# Patient Record
Sex: MALE | Race: WHITE | NOT HISPANIC OR LATINO
[De-identification: names, ages, dates, MRNs, and addresses within clinical notes are randomized per-mention and may not be internally consistent; named-entity substitution may affect disease eponyms.]

---

## 2017-04-11 ENCOUNTER — TRANSCRIPTION ENCOUNTER (OUTPATIENT)
Age: 52
End: 2017-04-11

## 2017-04-11 PROBLEM — Z00.00 ENCOUNTER FOR PREVENTIVE HEALTH EXAMINATION: Status: ACTIVE | Noted: 2017-04-11

## 2017-04-17 ENCOUNTER — APPOINTMENT (OUTPATIENT)
Dept: GASTROENTEROLOGY | Facility: CLINIC | Age: 52
End: 2017-04-17

## 2017-04-18 ENCOUNTER — APPOINTMENT (OUTPATIENT)
Dept: GASTROENTEROLOGY | Facility: CLINIC | Age: 52
End: 2017-04-18

## 2017-05-25 ENCOUNTER — EMERGENCY (EMERGENCY)
Facility: HOSPITAL | Age: 52
LOS: 1 days | Discharge: HOME | End: 2017-05-25
Attending: EMERGENCY MEDICINE | Admitting: EMERGENCY MEDICINE
Payer: SELF-PAY

## 2017-05-25 VITALS
TEMPERATURE: 98 F | WEIGHT: 184.97 LBS | RESPIRATION RATE: 16 BRPM | SYSTOLIC BLOOD PRESSURE: 130 MMHG | HEIGHT: 69 IN | OXYGEN SATURATION: 99 % | HEART RATE: 68 BPM | DIASTOLIC BLOOD PRESSURE: 86 MMHG

## 2017-05-25 DIAGNOSIS — Z98.890 OTHER SPECIFIED POSTPROCEDURAL STATES: Chronic | ICD-10-CM
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
## 2017-05-25 DIAGNOSIS — S62.367A NONDISPLACED FRACTURE OF NECK OF FIFTH METACARPAL BONE, LEFT HAND, INITIAL ENCOUNTER FOR CLOSED FRACTURE: ICD-10-CM

## 2017-05-25 DIAGNOSIS — M79.641 PAIN IN RIGHT HAND: ICD-10-CM

## 2017-05-25 PROCEDURE — 99283 EMERGENCY DEPT VISIT LOW MDM: CPT

## 2017-05-25 PROCEDURE — 73130 X-RAY EXAM OF HAND: CPT | Mod: 26,RT

## 2017-05-25 PROCEDURE — 26600 TREAT METACARPAL FRACTURE: CPT | Mod: F9

## 2017-05-25 PROCEDURE — 73130 X-RAY EXAM OF HAND: CPT

## 2017-05-25 PROCEDURE — 99285 EMERGENCY DEPT VISIT HI MDM: CPT | Mod: 25

## 2017-05-25 PROCEDURE — 73120 X-RAY EXAM OF HAND: CPT

## 2017-05-25 PROCEDURE — 73130 X-RAY EXAM OF HAND: CPT | Mod: 26,77,RT

## 2017-05-25 NOTE — ED PROVIDER NOTE - MUSCULOSKELETAL, MLM
Marked tenderness to palpation with swelling and ecchymosis to dorsum of right hand mostly over 4th and 5th MCPJ with intact N/V RUE

## 2017-05-25 NOTE — CONSULT NOTE ADULT - ASSESSMENT
A/P:  52y/o M with Right small finger metacarpal fracture s/p closed reduction with splint.  - Maintain splint, RUE elevation  - Pain control  - F/U post-reductio films  - F/U 1 week

## 2017-05-25 NOTE — ED PROVIDER NOTE - OBJECTIVE STATEMENT
50 yo white male with fist vs counter top few days ago and now presents with pain and swelling to right hand.

## 2017-05-25 NOTE — ED ADULT NURSE NOTE - OBJECTIVE STATEMENT
received pt with  self injury rt hand on sunday  bruises and slight swelling noted pt evaluated and awaiting xray

## 2017-05-25 NOTE — ED PROVIDER NOTE - CONSTITUTIONAL, MLM
Well appearing, white male, well nourished, awake, alert, oriented to person, place, time/situation and in no apparent distress. normal...

## 2017-05-25 NOTE — CONSULT NOTE ADULT - SUBJECTIVE AND OBJECTIVE BOX
52 y/o M, RHD, presents with right small finger pain after punching the granite countertop 3 days ago.  Patient reports pain and bruising.  Patient denies weakness/numbness in the hand.    PMhx/PShx: Denies  All: NKDA  SHx: NO toxic habits    AVSS  Right Hand:  + Ecchymosis/+Swelling in small finger with tenderness.  Intact neurovascular exam.      Xray Right Hand:    Findings: There is an age indeterminate fracture through the right fifth   metacarpal neck with resultant apex dorsal angulation. No additional   fractures noted. The imaged joint spaces appear unremarkable.    IMPRESSION: Fracture through the right fifth metacarpal neck, as detailed.    Procedure:  Right small finger prepped and draped.  Radial nerve block, digital block delivered.  Closed reduction of fracture.  Ulnar gutter splint applied.

## 2017-06-14 ENCOUNTER — EMERGENCY (EMERGENCY)
Facility: HOSPITAL | Age: 52
LOS: 1 days | Discharge: HOME | End: 2017-06-14
Attending: EMERGENCY MEDICINE | Admitting: EMERGENCY MEDICINE
Payer: SELF-PAY

## 2017-06-14 VITALS
RESPIRATION RATE: 12 BRPM | SYSTOLIC BLOOD PRESSURE: 114 MMHG | WEIGHT: 190.04 LBS | HEIGHT: 69 IN | HEART RATE: 70 BPM | TEMPERATURE: 98 F | DIASTOLIC BLOOD PRESSURE: 75 MMHG | OXYGEN SATURATION: 96 %

## 2017-06-14 DIAGNOSIS — S62.91XD UNSPECIFIED FRACTURE OF RIGHT WRIST AND HAND, SUBSEQUENT ENCOUNTER FOR FRACTURE WITH ROUTINE HEALING: ICD-10-CM
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
## 2017-06-14 DIAGNOSIS — Z98.890 OTHER SPECIFIED POSTPROCEDURAL STATES: Chronic | ICD-10-CM

## 2017-06-14 PROCEDURE — 99282 EMERGENCY DEPT VISIT SF MDM: CPT

## 2017-06-14 NOTE — ED ADULT NURSE NOTE - ED STAT RN HANDOFF DETAILS
pt wanted to speak to MD lowe before D/C. MD lowe at bs answering all questions, pt left without d/c paperwork and without vs.

## 2017-06-14 NOTE — ED PROVIDER NOTE - OBJECTIVE STATEMENT
50 y/o male presents to ED c/o right hand pain x 3 weeks. States he has a 5th metacarpal fracture and was splinted in the ER 3 weeks ago. States he is scheduled for surgery in july and has been following up with Dr Mendez. States he has been having some tingling in his fingers over the last 1 week. States he is annoyed that he is not getting surgery sooner. Denies any other complaints. States he otherwise feels good. Denies n/v, f/c, chest pain, sob, numbness. Denies recent fall or trauma.

## 2017-06-14 NOTE — ED PROVIDER NOTE - MEDICAL DECISION MAKING DETAILS
spoke with dr choi who advised pt f/u as out pt for out pt surgery. discussed with pt who understands.

## 2017-06-14 NOTE — ED PROVIDER NOTE - PROGRESS NOTE DETAILS
spoke with dr allen regarding pt history and physical exam, dr allen advised dc and f/u as outpt. pt NVI.

## 2017-06-14 NOTE — ED ADULT TRIAGE NOTE - CHIEF COMPLAINT QUOTE
patient with right hand in splint placed approx. 3 weeks ago, reports that as per Ortho hand needs to be placed in pins. pt has an appt with ortho in July but reports needing the procedure sooner because of pain, numbness and tingling in fingers.

## 2017-06-14 NOTE — ED PROVIDER NOTE - PHYSICAL EXAMINATION
right upper extremity- ulnar gutter splint intact. FROM 1st-3rd fingers. No redness, no warmth, no swelling, no bruising, no deformity, no tenderness. NVI, good distal pulses BL, capillary refill <2 sec BL, sensation intact throughout, 5/5 motor x 4 extremities.

## 2017-06-14 NOTE — ED PROVIDER NOTE - ATTENDING CONTRIBUTION TO CARE
I have personally performed a face to face diagnostic evaluation on this patient.  I have reviewed the PA note and agree with the history, exam, and plan of care, except as noted.  History and Exam by me shows  right hand fracture x 3 weeks. pt unable to work out payment c with Dr. Lyles and requested another referral.  right hand and wrist splinted. I have personally performed a face to face diagnostic evaluation on this patient.  I have reviewed the PA note and agree with the history, exam, and plan of care, except as noted.  History and Exam by me shows  right hand fracture x 3 weeks. pt unable to work out payment c with Dr. Lyles and requested another referral.  right hand and wrist splinted.  Pt wanted fx fix today, and was upset.  Pt has no insurance and was instructed on all available options for his care.

## 2021-01-17 ENCOUNTER — TRANSCRIPTION ENCOUNTER (OUTPATIENT)
Age: 56
End: 2021-01-17

## 2021-09-24 ENCOUNTER — TRANSCRIPTION ENCOUNTER (OUTPATIENT)
Age: 56
End: 2021-09-24

## 2021-10-13 ENCOUNTER — TRANSCRIPTION ENCOUNTER (OUTPATIENT)
Age: 56
End: 2021-10-13

## 2021-12-09 ENCOUNTER — TRANSCRIPTION ENCOUNTER (OUTPATIENT)
Age: 56
End: 2021-12-09

## 2025-09-04 ENCOUNTER — NON-APPOINTMENT (OUTPATIENT)
Age: 60
End: 2025-09-04

## 2025-09-05 ENCOUNTER — APPOINTMENT (OUTPATIENT)
Dept: RHEUMATOLOGY | Facility: CLINIC | Age: 60
End: 2025-09-05
Payer: COMMERCIAL

## 2025-09-05 ENCOUNTER — TRANSCRIPTION ENCOUNTER (OUTPATIENT)
Age: 60
End: 2025-09-05

## 2025-09-05 ENCOUNTER — LABORATORY RESULT (OUTPATIENT)
Age: 60
End: 2025-09-05

## 2025-09-05 VITALS
DIASTOLIC BLOOD PRESSURE: 86 MMHG | HEIGHT: 69 IN | OXYGEN SATURATION: 98 % | BODY MASS INDEX: 31.4 KG/M2 | RESPIRATION RATE: 16 BRPM | SYSTOLIC BLOOD PRESSURE: 119 MMHG | WEIGHT: 212 LBS | HEART RATE: 77 BPM

## 2025-09-05 DIAGNOSIS — H35.053 RETINAL NEOVASCULARIZATION, UNSPECIFIED, BILATERAL: ICD-10-CM

## 2025-09-05 LAB — TSH SERPL-ACNC: 2.13 UIU/ML

## 2025-09-05 PROCEDURE — G2211 COMPLEX E/M VISIT ADD ON: CPT | Mod: NC

## 2025-09-05 PROCEDURE — 99204 OFFICE O/P NEW MOD 45 MIN: CPT

## 2025-09-10 DIAGNOSIS — R76.0 RAISED ANTIBODY TITER: ICD-10-CM

## 2025-09-10 LAB
A PHAGOCYTOPH IGG TITR SER IF: NORMAL
B BURGDOR AB SER QL IA: 0.22 IV
B MICROTI IGG TITR SER: NORMAL
B2 GLYCOPROT1 IGA SERPL IA-ACNC: <2 U/ML
B2 GLYCOPROT1 IGG SERPL IA-ACNC: 4.1 U/ML
B2 GLYCOPROT1 IGM SERPL IA-ACNC: 4.1 U/ML
CARDIOLIPIN IGA SER IA-ACNC: <2 APL U/ML
CARDIOLIPIN IGG SER IA-ACNC: 2.9 GPL U/ML
CARDIOLIPIN IGM SER IA-ACNC: 3.4 MPL U/ML
CONFIRM: 31.7 SEC
DRVVT IMM 1:2 NP PPP: NORMAL
DRVVT SCREEN TO CONFIRM RATIO: 1.05 RATIO
E CHAFFEENSIS IGG TITR SER IF: NORMAL
ESTIMATED AVERAGE GLUCOSE: 120 MG/DL
HBA1C MFR BLD HPLC: 5.8 %
SCREEN DRVVT: 43.1 SEC
SILICA CLOTTING TIME INTERPRETATION: ABNORMAL
SILICA CLOTTING TIME S/C: 1.24 RATIO